# Patient Record
Sex: FEMALE | Race: WHITE | NOT HISPANIC OR LATINO | URBAN - NONMETROPOLITAN AREA
[De-identification: names, ages, dates, MRNs, and addresses within clinical notes are randomized per-mention and may not be internally consistent; named-entity substitution may affect disease eponyms.]

---

## 2017-12-29 PROBLEM — H35.3111: Noted: 2017-12-29

## 2017-12-29 PROBLEM — H35.3221: Noted: 2017-12-29

## 2017-12-29 PROBLEM — Z96.1: Noted: 2017-12-29

## 2017-12-29 PROBLEM — H52.4: Noted: 2017-12-29

## 2017-12-29 PROBLEM — H43.813: Noted: 2017-12-29

## 2019-03-29 ENCOUNTER — IMPORTED ENCOUNTER (OUTPATIENT)
Dept: URBAN - NONMETROPOLITAN AREA CLINIC 1 | Facility: CLINIC | Age: 79
End: 2019-03-29

## 2019-03-29 PROCEDURE — 92012 INTRM OPH EXAM EST PATIENT: CPT

## 2019-03-29 NOTE — PATIENT DISCUSSION
ARMD Dry OD and Wet OS - Discussed diagnosis in detail with patient- Recommend continue taking eye vitamins (Preservision)- Recommend continue to use the [address] call or come into the office ASAP if any changes noted from today- OCT done at last visit OD shows dry ARMD and OS shows fluid and thickening - Patient is following up with Dr. Rodolfo Waddell in 1 month- Patient is currently getting Avastin injections in the OS   - Continue to monitor- RTC PRN from Dr. Tori Loving- Discussed diagnosis in detail with patient- Patient is stable at this time - Continue to monitorPVD OU- Discussed findings of exam in detail with the patient. - The risk of retinal detachment in patients with PVDs was discussed with the patient and the warning signs of retinal detachment were carefully reviewed with the patient. - The patient was warned to return to the office or contact the ophthalmologist on call immediately if they experience signs of retinal detachment. - Continue to monitorPresbyopia OU- Discussed diagnosis in detail with patient - Continue to judy Blue's Notes: OCT 2/14/17

## 2019-10-01 ENCOUNTER — IMPORTED ENCOUNTER (OUTPATIENT)
Dept: URBAN - NONMETROPOLITAN AREA CLINIC 1 | Facility: CLINIC | Age: 79
End: 2019-10-01

## 2019-10-01 PROCEDURE — 92015 DETERMINE REFRACTIVE STATE: CPT

## 2019-10-01 PROCEDURE — 92014 COMPRE OPH EXAM EST PT 1/>: CPT

## 2019-10-01 PROCEDURE — 92134 CPTRZ OPH DX IMG PST SGM RTA: CPT

## 2019-10-01 NOTE — PATIENT DISCUSSION
ARMD Dry OD and Wet OS - Discussed diagnosis in detail with patient- Recommend continue taking eye vitamins (Preservision)- Recommend continue to use the [address] call or come into the office ASAP if any changes noted from today- OCT done today OD shows dry ARMD and OS shows fluid and thickening. Stable OU with GA noted. - She is only seeing me now hx of treatment/following with Dr. Minerva Garsia. - She reports that she forgets her eye vitamins reminded/stressed importance of use today. - Continue to monitor every 6 months or PRN Pseudophakia OU- Discussed diagnosis in detail with patient- Patient is stable at this time - Continue to monitorPVD OU- Discussed findings of exam in detail with the patient. - The risk of retinal detachment in patients with PVDs was discussed with the patient and the warning signs of retinal detachment were carefully reviewed with the patient. - The patient was warned to return to the office or contact the ophthalmologist on call immediately if they experience signs of retinal detachment or changes in vision noted from today.  - Continue to monitorPresbyopia OU- Discussed diagnosis in detail with patient - Continue to judy Blue's Notes: MR  10/1/19DFE  10/1/19Optos OCT mac  10/1/19

## 2020-06-08 ENCOUNTER — IMPORTED ENCOUNTER (OUTPATIENT)
Dept: URBAN - NONMETROPOLITAN AREA CLINIC 1 | Facility: CLINIC | Age: 80
End: 2020-06-08

## 2020-06-08 PROBLEM — H35.3222: Noted: 2020-06-08

## 2020-06-08 PROBLEM — H52.4: Noted: 2017-12-29

## 2020-06-08 PROBLEM — H35.3112: Noted: 2020-06-08

## 2020-06-08 PROBLEM — H43.813: Noted: 2017-12-29

## 2020-06-08 PROBLEM — Z96.1: Noted: 2017-12-29

## 2020-06-08 PROCEDURE — 92012 INTRM OPH EXAM EST PATIENT: CPT

## 2020-06-08 PROCEDURE — 92250 FUNDUS PHOTOGRAPHY W/I&R: CPT

## 2020-06-08 NOTE — PATIENT DISCUSSION
ARMD OU - moderate GA - Discussed diagnosis in detail with patient- Hx of wet OS - Recommend continue taking eye vitamins (Preservision)- Recommend continue to use the [address] call or come into the office ASAP if any changes noted from today- Optos done today: appears stable from previous- OCT done previously OD shows dry ARMD and OS shows fluid and thickening. Stable OU with GA noted. - She is only seeing me now hx of treatment/following with Dr. Leonela Colin. - She reports that she forgets her eye vitamins reminded/stressed importance of use today. - Continue to monitor every 6 months or PRN Pseudophakia OU- Discussed diagnosis in detail with patient- Patient is stable at this time - Continue to monitorPVD OU- Discussed findings of exam in detail with the patient. - The risk of retinal detachment in patients with PVDs was discussed with the patient and the warning signs of retinal detachment were carefully reviewed with the patient. - The patient was warned to return to the office or contact the ophthalmologist on call immediately if they experience signs of retinal detachment or changes in vision noted from today.  - Continue to monitorPresbyopia OU- Discussed diagnosis in detail with patient - Continue to judy Blue's Notes: MR  10/1/19DFE  10/1/19Optos OCT mac  10/1/19

## 2022-04-09 ASSESSMENT — TONOMETRY
OS_IOP_MMHG: 19
OD_IOP_MMHG: 16
OD_IOP_MMHG: 17
OS_IOP_MMHG: 19
OD_IOP_MMHG: 19
OS_IOP_MMHG: 18

## 2022-04-09 ASSESSMENT — VISUAL ACUITY
OD_SC: 20/25+
OS_SC: CF4'
OS_SC: CF 6FT